# Patient Record
Sex: MALE | URBAN - METROPOLITAN AREA
[De-identification: names, ages, dates, MRNs, and addresses within clinical notes are randomized per-mention and may not be internally consistent; named-entity substitution may affect disease eponyms.]

---

## 2022-10-05 ENCOUNTER — APPOINTMENT (RX ONLY)
Dept: URBAN - METROPOLITAN AREA CLINIC 103 | Facility: CLINIC | Age: 62
Setting detail: DERMATOLOGY
End: 2022-10-05

## 2022-10-05 DIAGNOSIS — L20.89 OTHER ATOPIC DERMATITIS: ICD-10-CM

## 2022-10-05 PROCEDURE — ? PRESCRIPTION

## 2022-10-05 PROCEDURE — ? RINVOQ INITIATION

## 2022-10-05 PROCEDURE — 99203 OFFICE O/P NEW LOW 30 MIN: CPT

## 2022-10-05 PROCEDURE — ? COUNSELING

## 2022-10-05 RX ORDER — TRIAMCINOLONE ACETONIDE 1 MG/G
CREAM TOPICAL BID
Qty: 80 | Refills: 0 | Status: ERX | COMMUNITY
Start: 2022-10-05

## 2022-10-05 RX ORDER — UPADACITINIB 15 MG/1
TABLET, EXTENDED RELEASE ORAL
Qty: 30 | Refills: 5 | Status: ERX | COMMUNITY
Start: 2022-10-05

## 2022-10-05 RX ORDER — CLOTRIMAZOLE 1 G/100G
CREAM TOPICAL
Qty: 45 | Refills: 0 | Status: ERX | COMMUNITY
Start: 2022-10-05

## 2022-10-05 RX ADMIN — CLOTRIMAZOLE: 1 CREAM TOPICAL at 00:00

## 2022-10-05 RX ADMIN — UPADACITINIB: 15 TABLET, EXTENDED RELEASE ORAL at 00:00

## 2022-10-05 RX ADMIN — TRIAMCINOLONE ACETONIDE: 1 CREAM TOPICAL at 00:00

## 2022-10-05 NOTE — PROCEDURE: RINVOQ INITIATION
Detail Level: Zone
Rinvoq Dosing: 15mg Daily
Diagnosis (Required): Atopic Dermatitis/Eczematous Dermatitis
Add Pregnancy And Lactation Warning To Medication Counseling?: No
Pregnancy And Lactation Warning Text: Based on animal studies, Rinvoq may cause embryo-fetal harm when administered to pregnant women. The medication should not be used in pregnancy. Breastfeeding is not recommended during treatment and for 6 days after the last dose.
Rinvoq Monitoring Guidelines: CBC, LFTs, lipids, hepatitis screening, TB screening and pregnancy tests should be checked prior to initiating Rinvoq therapy. Labs may also be checked periodically after the initiation period.

## 2022-10-05 NOTE — HPI: RASH
How Severe Is Your Rash?: moderate
Is This A New Presentation, Or A Follow-Up?: Referral for Rash
Who Is Your Referring Provider?: [facility]

## 2022-10-06 RX ORDER — TRIAMCINOLONE ACETONIDE 1 MG/G
CREAM TOPICAL BID
Qty: 80 | Refills: 0 | Status: ERX

## 2022-10-06 RX ORDER — CLOTRIMAZOLE 1 G/100G
CREAM TOPICAL
Qty: 45 | Refills: 0 | Status: ERX

## 2022-10-06 RX ORDER — UPADACITINIB 15 MG/1
TABLET, EXTENDED RELEASE ORAL
Qty: 30 | Refills: 5 | Status: ERX

## 2022-11-09 ENCOUNTER — APPOINTMENT (RX ONLY)
Dept: URBAN - METROPOLITAN AREA CLINIC 103 | Facility: CLINIC | Age: 62
Setting detail: DERMATOLOGY
End: 2022-11-09

## 2022-11-09 DIAGNOSIS — L20.89 OTHER ATOPIC DERMATITIS: ICD-10-CM

## 2022-11-09 PROCEDURE — 99213 OFFICE O/P EST LOW 20 MIN: CPT

## 2022-11-09 PROCEDURE — ? COUNSELING

## 2022-11-09 PROCEDURE — ? PRESCRIPTION

## 2022-11-09 RX ORDER — TRIAMCINOLONE ACETONIDE 1 MG/G
CREAM TOPICAL BID
Qty: 80 | Refills: 0 | Status: ERX